# Patient Record
(demographics unavailable — no encounter records)

---

## 2024-10-17 NOTE — HISTORY OF PRESENT ILLNESS
[Home] : at home, [unfilled] , at the time of the visit. [Medical Office: ([facility])___] : at the medical office located in  [Verbal consent obtained from patient] : the patient, [unfilled] [de-identified] : Called for renewal of meds. Taking Xanax only sparingly  History of hyperlipidemia and HTN good control  Blood pressure at home 128/78

## 2024-11-12 NOTE — ASSESSMENT
[FreeTextEntry1] : 75 Y/O female with GSM and history of bladder pressure much improved.  Will continue Estradiol 0.5 gm twice weekly intermittently wit OTC Silky peach  follow up 1 year Estradiol renewed.   Urine Dip negative

## 2024-11-12 NOTE — HISTORY OF PRESENT ILLNESS
[FreeTextEntry1] : EDEN AMARAL  74 year F presents for follow up. Doing extremely well. On 0.5 gm intravaginal estradiol. ( Ok by Onc). Starting using Silky peach OTC topical intermittently History of left breast cancer Resolved pelvic pressure.  Sexually active with no issues.     [Urinary Incontinence] : no urinary incontinence [Urinary Retention] : no urinary retention [Urinary Urgency] : no urinary urgency

## 2025-01-23 NOTE — HISTORY OF PRESENT ILLNESS
[FreeTextEntry1] : Mrs. Collins a 75 year-old female with a history of breast cancer status post radiation to the chest, a recently diagnosed SVT, here for follow up cardiovascular evaluation.  Of note, she reports 3 episodes of palpitations in the past, the most recent occurring in October 2017. After an episode of palpitations, which did not self terminate, she had an episode of near syncope. She went to [facility] on October 7, 2017. It is unclear if there is any documented SVT although she was told this was the diagnosis by the cardiologists there. At [place], her cholesterol was 248,  HDL 80. Her CBC was unremarkable. Her BMP was notable for a potassium of 5.5 and an AST of 49. Cardiac enzymes were negative. A CT head was negative.  She had an echocardiogram with normal left ventricular size and systolic function and a visually estimated ejection fraction of 55%. Mild mitral regurgitation and mild aortic regurgitation were noted. She also had a nuclear stress test which showed no evidence of inducible ischemia. She reports episodes of these palpitations and chest pain in the past. The initial episode occurred [place] ago [place] the beach in [facility] in the setting of dehydration, and resolved without intervention. During the brief hospitalization in October she was noted to have elevated blood pressure, and was started on hydrochlorothiazide. About a week after starting hydrochlorothiazide she had another episode of these palpitations and discomfort. It resolved with lying in a fetal position. She has not taken hydrochlorothiazide since. She had recent labs and her PMD office which confirmed mildly elevated LDL and a calcium of 10.6. Her calcium supplementation was stopped. Of note, she has a history of breast cancer with radiation to the chest wall, with a lumpectomy.  She was last seen in person in 4/24.  She is feeling well. She has avoided dehydration, and has decreased her alcohol consumption. She has had no episodes of SVT or palpitations since starting her Toprol XL 25 mg po daily.   Her SBP has been in the 120-130s range at home when periodically checked. She is on norvasc 2.5. She denies chest pain, any change in her exercise tolerance, shortness of breath, lower extremity edema, orthopnea, PND, dizziness, lightheadedness, and other associated complaints. Carotid plaque with mild to moderate plaque in 2024. I twisted her arm, and she agreed to take a statin. She is now on lovastatin.

## 2025-01-23 NOTE — DISCUSSION/SUMMARY
[___ Month(s)] : in [unfilled] month(s) [FreeTextEntry1] : Marla is a 75-year-old female with a history of breast cancer status post radiation to the chest, a recently diagnosed SVT, here for follow up cardiovascular evaluation. She has been feeling well as of late, without palpitations or symptoms of SVT.  Her blood pressure is better, and she will continue amlodipine 5. Echocardiograms have shown normal LV function with mild valvular disease. An exercise nuclear stress test showed no sign of inducible ischemia, though back in 2017. She will continue taking Toprol XL. She will continue to avoid diuretics given her sensitivity to dehydration.   Carotid dopplers confirm the presence of mild plaque, and she has started taking a statin. She is going to have BW with her PMD shortly, and will get me the results.  She knows to call with any issues or concerns. She will follow up with me in 6 months. [EKG obtained to assist in diagnosis and management of assessed problem(s)] : EKG obtained to assist in diagnosis and management of assessed problem(s)

## 2025-02-19 NOTE — HISTORY OF PRESENT ILLNESS
[Gradual] : gradual [3] : 3 [Dull/Aching] : dull/aching [Intermittent] : intermittent [Leisure] : leisure [Rest] : rest [Stairs] : stairs [Retired] : Work status: retired [] : Post Surgical Visit: no [FreeTextEntry1] : R Knee  [de-identified] : 7/8/2024 [de-identified] : 7/8/2024

## 2025-02-19 NOTE — REASON FOR VISIT
[FreeTextEntry2] : R Knee eval. Patient previously had Gelsyn 7/8/2024. She would like to discuss trying a different injection, wants to try one injection.

## 2025-02-19 NOTE — PROCEDURE
[Large Joint Injection] : Large joint injection [Right] : of the right [Knee] : knee [Pain] : pain [X-ray evidence of Osteoarthritis on this or prior visit] : x-ray evidence of Osteoarthritis on this or prior visit [Betadine] : betadine [Ethyl Chloride sprayed topically] : ethyl chloride sprayed topically [Sterile technique used] : sterile technique used [#1] : series #1 [] : Patient tolerated procedure well [Apply ice for 15min out of every hour for the next 12-24 hours as tolerated] : apply ice for 15 minutes out of every hour for the next 12-24 hours as tolerated [Patient was advised to rest the joint(s) for ____ days] : patient was advised to rest the joint(s) for [unfilled] days [Risks, benefits, alternatives discussed / Verbal consent obtained] : the risks benefits, and alternatives have been discussed, and verbal consent was obtained [Prior failure or difficult injection] : prior failure or difficult injection [All ultrasound images have been permanently captured and stored accordingly in our picture archiving and communication system] : All ultrasound images have been permanently captured and stored accordingly in our picture archiving and communication system [Visualization of the needle and placement of injection was performed without complication] : visualization of the needle and placement of injection was performed without complication [Gel-One (30mg)] : 30mg of Gel-One

## 2025-02-19 NOTE — ASSESSMENT
[FreeTextEntry1] : wants to start injection today. Discussed possible reactions including infection and wants to proceed; wants to try a different HA; so will try Gel-One. Also discussed Zilretta in near future if symptoms recur prior to 6 months.   recommend rest and apply ice to the knee today

## 2025-02-19 NOTE — PHYSICAL EXAM
[NL (0)] : extension 0 degrees [5___] : quadriceps 5[unfilled]/5 [Right] : right knee [AP] : anteroposterior [Lateral] : lateral [[place]] : skyline [Moderate tricompartmental OA medial narrowing] : Moderate tricompartmental OA medial narrowing [Moderate patellofemoral OA] : Moderate patellofemoral OA [] : non-antalgic [TWNoteComboBox7] : flexion 130 degrees

## 2025-03-05 NOTE — HISTORY OF PRESENT ILLNESS
[Aortic Stenosis] : no aortic stenosis [Atrial Fibrillation] : no atrial fibrillation [Coronary Artery Disease] : no coronary artery disease [Recent Myocardial Infarction] : no recent myocardial infarction [(Patient denies any chest pain, claudication, dyspnea on exertion, orthopnea, palpitations or syncope)] : Patient denies any chest pain, claudication, dyspnea on exertion, orthopnea, palpitations or syncope [FreeTextEntry1] : left Cataract  [FreeTextEntry2] : 3/11/25 [FreeTextEntry3] : Dr. Navarro [FreeTextEntry4] : Good exercise tolerance. history of SVT- followed by cardiology - stable on metoprolol ER 25mg

## 2025-03-05 NOTE — ASSESSMENT
[No Further Testing Recommended] : no further testing recommended [FreeTextEntry4] : Medically cleared for proposed procedure. To take metoprolol and amlodipine am of procedure with sips of water.